# Patient Record
Sex: FEMALE | Race: WHITE | Employment: FULL TIME | URBAN - METROPOLITAN AREA
[De-identification: names, ages, dates, MRNs, and addresses within clinical notes are randomized per-mention and may not be internally consistent; named-entity substitution may affect disease eponyms.]

---

## 2017-05-24 ENCOUNTER — OFFICE VISIT (OUTPATIENT)
Dept: FAMILY MEDICINE CLINIC | Facility: CLINIC | Age: 27
End: 2017-05-24

## 2017-05-24 VITALS
DIASTOLIC BLOOD PRESSURE: 82 MMHG | HEIGHT: 66 IN | SYSTOLIC BLOOD PRESSURE: 132 MMHG | BODY MASS INDEX: 24.91 KG/M2 | TEMPERATURE: 98 F | OXYGEN SATURATION: 98 % | HEART RATE: 87 BPM | WEIGHT: 155 LBS

## 2017-05-24 DIAGNOSIS — B02.9 HERPES ZOSTER WITHOUT COMPLICATION: Primary | ICD-10-CM

## 2017-05-24 PROCEDURE — 99213 OFFICE O/P EST LOW 20 MIN: CPT | Performed by: FAMILY MEDICINE

## 2017-05-24 RX ORDER — VALACYCLOVIR HYDROCHLORIDE 1 G/1
1 TABLET, FILM COATED ORAL 3 TIMES DAILY
Qty: 21 TABLET | Refills: 0 | Status: SHIPPED | OUTPATIENT
Start: 2017-05-24 | End: 2017-05-31

## 2017-05-24 RX ORDER — ONDANSETRON 8 MG/1
8 TABLET, ORALLY DISINTEGRATING ORAL EVERY 8 HOURS PRN
Qty: 24 TABLET | Refills: 0 | Status: SHIPPED | OUTPATIENT
Start: 2017-05-24 | End: 2017-05-31

## 2017-05-24 RX ORDER — LIDOCAINE 50 MG/G
3 PATCH TOPICAL EVERY 24 HOURS
Qty: 30 PATCH | Refills: 0 | Status: SHIPPED | OUTPATIENT
Start: 2017-05-24 | End: 2018-09-16

## 2017-05-24 NOTE — PATIENT INSTRUCTIONS
Take valtrex three times daily for 7 days. Take with food. Use zofran as needed for nausea. Apply lidoderm patches-- up to 3 patches at a time-- on for 12 hours, then off for 12 hours.    Increase fluids and rest.   If no better in 2-3 days, follow-up f

## 2017-05-24 NOTE — PROGRESS NOTES
CHIEF COMPLAINT:   Patient presents with:  Rash Skin Problem (integumentary): right leg pain x 3-4 days-- small red bump, now several red bumps on inner thigh.           HPI:   Alisha Duffy is a 23year old female who presents for evaluation of a rash Denies shortness of breath with exertion or rest. No cough or wheezing. LYMPH: Denies enlargement of the lymph nodes. NEURO: Denies abnormal sensation, tingling of the skin, or numbness.       EXAM:   /82 mmHg  Pulse 87  Temp(Src) 98.1 °F (36.7 °C) hours, then off for 12 hours. Increase fluids and rest.   If no better in 2-3 days, follow-up for further evaluation. The patient indicates understanding of these issues and agrees to the plan.   The patient is asked to return in 3 days if sx pers

## 2017-05-25 ENCOUNTER — HOSPITAL ENCOUNTER (EMERGENCY)
Facility: HOSPITAL | Age: 27
Discharge: HOME | End: 2017-05-25
Attending: EMERGENCY MEDICINE
Payer: MEDICAID

## 2017-05-25 VITALS
TEMPERATURE: 98 F | DIASTOLIC BLOOD PRESSURE: 79 MMHG | HEART RATE: 89 BPM | WEIGHT: 155 LBS | BODY MASS INDEX: 25.83 KG/M2 | RESPIRATION RATE: 16 BRPM | SYSTOLIC BLOOD PRESSURE: 153 MMHG | HEIGHT: 65 IN | OXYGEN SATURATION: 96 %

## 2017-05-25 DIAGNOSIS — B02.8 HERPES ZOSTER WITH COMPLICATION: ICD-10-CM

## 2017-05-25 DIAGNOSIS — J01.00 ACUTE MAXILLARY SINUSITIS, RECURRENCE NOT SPECIFIED: Primary | ICD-10-CM

## 2017-05-25 PROCEDURE — 99283 EMERGENCY DEPT VISIT LOW MDM: CPT

## 2017-05-25 PROCEDURE — 81025 URINE PREGNANCY TEST: CPT

## 2017-05-25 RX ORDER — HYDROCODONE BITARTRATE AND ACETAMINOPHEN 5; 325 MG/1; MG/1
1-2 TABLET ORAL EVERY 6 HOURS PRN
Qty: 20 TABLET | Refills: 0 | Status: SHIPPED | OUTPATIENT
Start: 2017-05-25 | End: 2017-06-01

## 2017-05-25 RX ORDER — GUAIFENESIN 600 MG
600 TABLET, EXTENDED RELEASE 12 HR ORAL 2 TIMES DAILY
Qty: 10 TABLET | Refills: 0 | Status: SHIPPED | OUTPATIENT
Start: 2017-05-25 | End: 2017-05-30

## 2017-05-25 RX ORDER — METHYLPREDNISOLONE 4 MG/1
TABLET ORAL
Qty: 1 PACKAGE | Refills: 0 | Status: SHIPPED | OUTPATIENT
Start: 2017-05-25 | End: 2017-05-30

## 2017-05-25 RX ORDER — FLUTICASONE PROPIONATE 50 MCG
2 SPRAY, SUSPENSION (ML) NASAL DAILY
Qty: 16 G | Refills: 0 | Status: SHIPPED | OUTPATIENT
Start: 2017-05-25 | End: 2017-06-24

## 2017-05-25 RX ORDER — HYDROCODONE BITARTRATE AND ACETAMINOPHEN 5; 325 MG/1; MG/1
2 TABLET ORAL ONCE
Status: COMPLETED | OUTPATIENT
Start: 2017-05-25 | End: 2017-05-25

## 2017-05-25 RX ORDER — ALBUTEROL SULFATE 90 UG/1
2 AEROSOL, METERED RESPIRATORY (INHALATION) EVERY 4 HOURS PRN
Qty: 1 INHALER | Refills: 0 | Status: SHIPPED | OUTPATIENT
Start: 2017-05-25 | End: 2017-06-24

## 2017-05-25 NOTE — ED PROVIDER NOTES
Patient Seen in: [facility] Emergency Department    History   Patient presents with:  Rash Skin Problem (integumentary)    Stated Complaint: shingles    HPI    The patient is a 67-year-old female presenting to the emergency department due to diffuse (VALTREX) 1 G Oral Tab,  Take 1 tablet (1,000 mg total) by mouth 3 (three) times daily.    ondansetron (ZOFRAN ODT) 8 MG Oral Tablet Dispersible,  Take 1 tablet (8 mg total) by mouth every 8 (eight) hours as needed for Nausea.   lidocaine (LIDODERM) 5 % Ext trismus or drooling. Oropharynx nml. Uvula is midline and there is no tonsillar erythema, hypertrophy or exudates.   Mucus membranes moist.   Supple neck  Cardiovascular: Regular rhythm without murmurs rubs or gallops, no peripheral edema or JVD  Lungs: C zoster with complication    Disposition:  Discharge    Follow-up:  Dereje Edwards DO  [address] [address]  [phone]    Call in 1 day  for follow up with primary care      Medications Prescribed:  Discharge Medication

## 2017-05-25 NOTE — ED INITIAL ASSESSMENT (HPI)
Patient presents to ED with rash to inner right thigh x 4 days. It is causing a burning sensation. Yesterday she noticed cold symptoms, which have worsened today.

## 2018-05-17 ENCOUNTER — APPOINTMENT (OUTPATIENT)
Dept: GENERAL RADIOLOGY | Facility: HOSPITAL | Age: 28
End: 2018-05-17
Attending: PEDIATRICS

## 2018-05-17 ENCOUNTER — HOSPITAL ENCOUNTER (EMERGENCY)
Facility: HOSPITAL | Age: 28
Discharge: HOME | End: 2018-05-17
Attending: PEDIATRICS

## 2018-05-17 VITALS
OXYGEN SATURATION: 100 % | BODY MASS INDEX: 28 KG/M2 | WEIGHT: 167 LBS | DIASTOLIC BLOOD PRESSURE: 98 MMHG | RESPIRATION RATE: 20 BRPM | SYSTOLIC BLOOD PRESSURE: 141 MMHG | HEART RATE: 89 BPM

## 2018-05-17 DIAGNOSIS — S61.419A LACERATION OF HAND WITHOUT FOREIGN BODY, UNSPECIFIED LATERALITY, INITIAL ENCOUNTER: Primary | ICD-10-CM

## 2018-05-17 DIAGNOSIS — S61.217A LACERATION OF LEFT LITTLE FINGER, FOREIGN BODY PRESENCE UNSPECIFIED, NAIL DAMAGE STATUS UNSPECIFIED, INITIAL ENCOUNTER: ICD-10-CM

## 2018-05-17 PROCEDURE — 73140 X-RAY EXAM OF FINGER(S): CPT | Performed by: PEDIATRICS

## 2018-05-17 PROCEDURE — 99283 EMERGENCY DEPT VISIT LOW MDM: CPT

## 2018-05-17 PROCEDURE — 12001 RPR S/N/AX/GEN/TRNK 2.5CM/<: CPT

## 2018-05-17 RX ORDER — ACETAMINOPHEN 500 MG
1000 TABLET ORAL ONCE
Status: COMPLETED | OUTPATIENT
Start: 2018-05-17 | End: 2018-05-17

## 2018-05-17 NOTE — ED PROVIDER NOTES
Patient Seen in: [facility] Emergency Department    History   Patient presents with:  Laceration Abrasion (integumentary)    Stated Complaint: lac    HPI    Patient is a 21-year-old female here with 2 lacerations.   She was at work when a glass dropp measuring about 1/2 cm to the medial aspect of the right wrist.  It is self approximated. There is also a 2 and half centimeter laceration to the MCP area and the volar surface of the left fifth digit. No tendon involvement. No active bleeding.   Neurolo

## 2018-05-17 NOTE — ED INITIAL ASSESSMENT (HPI)
Pt here with report of breaking a glass at work and has multiple laceration to her right palm and wrist and left 5th digit.

## 2018-05-17 NOTE — ED NOTES
No additional testing required per patient's occupation, patient's occupation not contracted with [employer].

## 2018-09-16 ENCOUNTER — HOSPITAL ENCOUNTER (EMERGENCY)
Facility: HOSPITAL | Age: 28
Discharge: HOME | End: 2018-09-16
Attending: EMERGENCY MEDICINE

## 2018-09-16 VITALS
HEIGHT: 67 IN | HEART RATE: 92 BPM | RESPIRATION RATE: 18 BRPM | OXYGEN SATURATION: 96 % | WEIGHT: 160 LBS | TEMPERATURE: 98 F | BODY MASS INDEX: 25.11 KG/M2

## 2018-09-16 DIAGNOSIS — R21 RASH AND NONSPECIFIC SKIN ERUPTION: ICD-10-CM

## 2018-09-16 DIAGNOSIS — J02.9 PHARYNGITIS, UNSPECIFIED ETIOLOGY: Primary | ICD-10-CM

## 2018-09-16 PROCEDURE — 99283 EMERGENCY DEPT VISIT LOW MDM: CPT

## 2018-09-16 RX ORDER — PREDNISONE 20 MG/1
40 TABLET ORAL DAILY
Qty: 10 TABLET | Refills: 0 | Status: SHIPPED | OUTPATIENT
Start: 2018-09-16 | End: 2018-09-21

## 2018-09-16 RX ORDER — AZITHROMYCIN 250 MG/1
TABLET, FILM COATED ORAL
Qty: 1 PACKAGE | Refills: 0 | Status: SHIPPED | OUTPATIENT
Start: 2018-09-16 | End: 2018-09-21

## 2018-09-16 NOTE — ED INITIAL ASSESSMENT (HPI)
Patient sts 2 weeks rashes/bumps on bilateral hands and is now spreading. Pt sts feels the same way as when she last had shingles. Painful.

## 2018-09-17 NOTE — ED PROVIDER NOTES
Patient Seen in: [facility] Emergency Department    History   Patient presents with:  Rash Skin Problem (integumentary)    Stated Complaint: rash to hand, r/o shingles    HPI    33 old female presents emergency department who states 2 weeks ago had rebound no guarding  no hepatosplenomegaly bowel sounds are present , no pulsatile mass  Extremities: No clubbing cyanosis or edema 2+ distal pulses does have a rash on both dorsums of hands. Almost looks scarlatiniform.   Neuro: Cranial nerves II through

## 2018-11-24 ENCOUNTER — APPOINTMENT (OUTPATIENT)
Dept: CT IMAGING | Facility: HOSPITAL | Age: 28
End: 2018-11-24
Payer: COMMERCIAL

## 2018-11-24 ENCOUNTER — HOSPITAL ENCOUNTER (EMERGENCY)
Facility: HOSPITAL | Age: 28
Discharge: HOME | End: 2018-11-25
Payer: COMMERCIAL

## 2018-11-24 DIAGNOSIS — M79.18 BUTTOCK PAIN: Primary | ICD-10-CM

## 2018-11-24 DIAGNOSIS — S30.0XXA SACRAL CONTUSION, INITIAL ENCOUNTER: ICD-10-CM

## 2018-11-24 PROCEDURE — 81003 URINALYSIS AUTO W/O SCOPE: CPT

## 2018-11-24 PROCEDURE — 96375 TX/PRO/DX INJ NEW DRUG ADDON: CPT

## 2018-11-24 PROCEDURE — 96361 HYDRATE IV INFUSION ADD-ON: CPT

## 2018-11-24 PROCEDURE — 85025 COMPLETE CBC W/AUTO DIFF WBC: CPT

## 2018-11-24 PROCEDURE — 80053 COMPREHEN METABOLIC PANEL: CPT

## 2018-11-24 PROCEDURE — 99284 EMERGENCY DEPT VISIT MOD MDM: CPT

## 2018-11-24 PROCEDURE — 84703 CHORIONIC GONADOTROPIN ASSAY: CPT

## 2018-11-24 PROCEDURE — 74177 CT ABD & PELVIS W/CONTRAST: CPT

## 2018-11-24 PROCEDURE — 96374 THER/PROPH/DIAG INJ IV PUSH: CPT

## 2018-11-24 RX ORDER — CLINDAMYCIN HYDROCHLORIDE 300 MG/1
300 CAPSULE ORAL 4 TIMES DAILY
Qty: 28 CAPSULE | Refills: 0 | Status: SHIPPED | OUTPATIENT
Start: 2018-11-24 | End: 2018-12-01

## 2018-11-24 RX ORDER — KETOROLAC TROMETHAMINE 30 MG/ML
10 INJECTION, SOLUTION INTRAMUSCULAR; INTRAVENOUS ONCE
Status: COMPLETED | OUTPATIENT
Start: 2018-11-24 | End: 2018-11-24

## 2018-11-24 RX ORDER — ACETAMINOPHEN AND CODEINE PHOSPHATE 300; 30 MG/1; MG/1
1 TABLET ORAL EVERY 4 HOURS PRN
Qty: 10 TABLET | Refills: 0 | Status: SHIPPED | OUTPATIENT
Start: 2018-11-24

## 2018-11-24 RX ORDER — MORPHINE SULFATE 4 MG/ML
2 INJECTION, SOLUTION INTRAMUSCULAR; INTRAVENOUS ONCE
Status: COMPLETED | OUTPATIENT
Start: 2018-11-24 | End: 2018-11-24

## 2018-11-24 RX ORDER — POLYETHYLENE GLYCOL 3350 17 G/17G
17 POWDER, FOR SOLUTION ORAL DAILY PRN
Qty: 10 EACH | Refills: 0 | Status: SHIPPED | OUTPATIENT
Start: 2018-11-24 | End: 2018-12-01

## 2018-11-24 RX ORDER — SODIUM CHLORIDE 9 MG/ML
INJECTION, SOLUTION INTRAVENOUS CONTINUOUS
Status: DISCONTINUED | OUTPATIENT
Start: 2018-11-24 | End: 2018-11-25

## 2018-11-25 VITALS
RESPIRATION RATE: 18 BRPM | SYSTOLIC BLOOD PRESSURE: 122 MMHG | WEIGHT: 165 LBS | OXYGEN SATURATION: 100 % | HEIGHT: 66 IN | DIASTOLIC BLOOD PRESSURE: 99 MMHG | HEART RATE: 86 BPM | BODY MASS INDEX: 26.52 KG/M2 | TEMPERATURE: 97 F

## 2018-11-25 NOTE — ED PROVIDER NOTES
Patient Seen in: [facility] Emergency Department    History   Patient presents with:  Trauma (cardiovascular, musculoskeletal)    Stated Complaint: tailbone pain after fall down 4 stairs    HPI    81-year-old female presents to the emergency departm Current Every Day Smoker      Smokeless tobacco: Never Used    Alcohol use: Not on file    Drug use: Not on file      Review of Systems    Positive for stated complaint: tailbone pain after fall down 4 stairs  Other systems are as noted in HPI.   Constituti throughout both legs and feet      ED Course     Labs Reviewed   COMP METABOLIC PANEL (14) - Abnormal; Notable for the following components:       Result Value    BUN/CREA Ratio 22.1 (*)     All other components within normal limits   URINALYSIS WITH CULTU BILIARY:  No biliary ductal dilatation. PANCREAS:  Homogeneous enhancement. SPLEEN:  Normal caliber. KIDNEYS:  No hydronephrosis or focal renal mass. Duplicated left renal collecting system. ADRENALS:  Normal. AORTA/VASCULAR:  No aneurysm.  RETROPERITONEUM and prescription treatment options and Physician follow up plan was discussed. The patient is discharged home in good condition.             Disposition and Plan     Clinical Impression:  Buttock pain  (primary encounter diagnosis)  Sacral contusion, i

## 2018-11-25 NOTE — ED INITIAL ASSESSMENT (HPI)
Pt states fall down the stairs on Wednesday, back pain radiating forward in the pelvis. Pt states she has been taking ibuprofen for pain without relief.

## 2018-11-29 ENCOUNTER — OFFICE VISIT (OUTPATIENT)
Dept: SURGERY | Facility: CLINIC | Age: 28
End: 2018-11-29

## 2018-11-29 VITALS — TEMPERATURE: 97 F

## 2018-11-29 DIAGNOSIS — M54.5 ACUTE MIDLINE LOW BACK PAIN, WITH SCIATICA PRESENCE UNSPECIFIED: Primary | ICD-10-CM

## 2018-11-29 DIAGNOSIS — K42.9 UMBILICAL HERNIA WITHOUT OBSTRUCTION AND WITHOUT GANGRENE: ICD-10-CM

## 2018-11-29 PROCEDURE — 99203 OFFICE O/P NEW LOW 30 MIN: CPT | Performed by: COLON & RECTAL SURGERY

## 2018-11-29 RX ORDER — COVID-19 ANTIGEN TEST
220 KIT MISCELLANEOUS
COMMUNITY

## 2018-11-29 NOTE — H&P
New Patient Visit Note       Active Problems      1. Acute midline low back pain, with sciatica presence unspecified    2.  Umbilical hernia without obstruction and without gangrene        Chief Complaint   Sacral tailbone pain    History of Present Illness History    Socioeconomic History      Marital status: Single      Spouse name: Not on file      Number of children: Not on file      Years of education: Not on file      Highest education level: Not on file    Tobacco Use      Smoking status: Current Every well-nourished. No distress. HENT:   Head: Normocephalic and atraumatic. Nose: Nose normal.   Eyes: EOM are normal. Pupils are equal, round, and reactive to light. No scleral icterus. Neck: Normal range of motion.    Cardiovascular: Normal rate and re Assessment   Acute midline low back pain, with sciatica presence unspecified  (primary encounter diagnosis)  Umbilical hernia without obstruction and without gangrene      Plan   This patient has an ongoing history of lower back and sacral and c

## 2018-12-02 NOTE — PATIENT INSTRUCTIONS
Assessment   Acute midline low back pain, with sciatica presence unspecified  (primary encounter diagnosis)  Umbilical hernia without obstruction and without gangrene      Plan   This patient has an ongoing history of lower back and sacral and coccygeal pa

## (undated) NOTE — LETTER
Date & Time: 9/16/2018, 2:43 PM  Patient: Khris Ashraf  Encounter Provider(s):    Huong Garcia MD       To Whom It May Concern:    Khris Ashraf was seen and treated in our department on 9/16/2018.  She .    If you have any questions or concern

## (undated) NOTE — ED AVS SNAPSHOT
Rupesh Morales   MRN: [id number]    Department:  [facility] Emergency Department   Date of Visit:  5/17/2018           Disclosure     Insurance plans vary and the physician(s) referred by the ER may not be covered by your plan.  Please contact tell this physician (or your personal doctor if your instructions are to return to your personal doctor) about any new or lasting problems. The primary care or specialist physician will see patients referred from the [facility] Emergency Department.  Nicolas Alford

## (undated) NOTE — MR AVS SNAPSHOT
[id number] [facility] [address] [phone]               Thank you for choosing us for your health care visit with Da Pascal PA-C.   We are glad to serve you and happy to provide you with this summary of y Phone:  [phone]    - lidocaine 5 % Ptch  - ondansetron 8 MG Tbdp  - ValACYclovir HCl 1 G Tabs            [employer]     Sign up for [employer], your secure online medical record.   [employer] will allow you to access patient instructions from your recent visi Start activities slowly and build up over time Do what you like   Get your heart pumping – brisk walking, biking, swimming Even 10 minute increments are effective and add up over the week   2 ½ hours per week – spread out over time Use a gaston to keep you

## (undated) NOTE — ED AVS SNAPSHOT
Wally Ventura   MRN: [id number]    Department:  [facility] Emergency Department   Date of Visit:  9/16/2018           Disclosure     Insurance plans vary and the physician(s) referred by the ER may not be covered by your plan.  Please contact tell this physician (or your personal doctor if your instructions are to return to your personal doctor) about any new or lasting problems. The primary care or specialist physician will see patients referred from the [facility] Emergency Department.  Parminder Reynoso

## (undated) NOTE — ED AVS SNAPSHOT
[facility] Emergency Department    [facility]  [address] [address]    Phone:  [phone]    Fax:  [phone] [facility] [address]   MRN: [id number]    Department:  [facility] Emergency Department   Date of Visit:  5 other Tylenol containing products. ).        methylPREDNISolone 4 MG Tbpk   Quantity:  1 Package   Commonly known as:  MEDROL DOSEPACK   Dosepack: use as directed on packaging            Where to Get Your Medications      You can get these medications from a a detailed feedback survey mailed to them a week after the visit. If you receive this, we would really appreciate it if you could take the time to complete it. Thank you! You were examined and treated today on an urgent basis only.   This was not a chaudhary [facility] ([address]) [facility] [address] [facility]. ([address]) [facility] [address] ([address]) [address]  [address] [facility] [address] medical emergencies, dial 911.

## (undated) NOTE — LETTER
Date & Time: 11/24/2018, 11:25 PM  Patient: Rupesh Morales  Encounter Provider(s):    Bob Berger MD       To Whom It May Concern:    Rupesh Morales was seen and treated in our department on 11/24/2018.  She should not return to work until 6

## (undated) NOTE — Clinical Note
05/24/2017    Patient Name:  Kayla Herrera        To Whom it may concern:    Kayla Herrera was evaluated in the office today and should be excused from attending work on 5/24/17. She may return to work on 5/25/17. Sincerely,     Johanna Rouse.  TACOS

## (undated) NOTE — ED AVS SNAPSHOT
[facility] Emergency Department    [facility]  [address] [address]    Phone:  [phone]    Fax:  [address] [facility] [address]   MRN: [id number]    Department:  [facility] Emergency Department   Date of Visit:  5 IF THERE IS ANY CHANGE OR WORSENING OF YOUR CONDITION, CALL YOUR PRIMARY CARE PHYSICIAN AT ONCE OR RETURN IMMEDIATELY TO THE EMERGENCY DEPARTMENT.     If you have been prescribed any medication(s), please fill your prescription right away and begin taking t

## (undated) NOTE — ED AVS SNAPSHOT
Rosa Maria Saroj   MRN: [id number]    Department:  [facility] Emergency Department   Date of Visit:  11/24/2018           Disclosure     Insurance plans vary and the physician(s) referred by the ER may not be covered by your plan.  Please contact tell this physician (or your personal doctor if your instructions are to return to your personal doctor) about any new or lasting problems. The primary care or specialist physician will see patients referred from the [facility] Emergency Department.  Giovanna De Souza